# Patient Record
Sex: MALE | Race: WHITE
[De-identification: names, ages, dates, MRNs, and addresses within clinical notes are randomized per-mention and may not be internally consistent; named-entity substitution may affect disease eponyms.]

---

## 2018-05-21 ENCOUNTER — HOSPITAL ENCOUNTER (EMERGENCY)
Dept: HOSPITAL 62 - ER | Age: 38
Discharge: HOME | End: 2018-05-21
Payer: COMMERCIAL

## 2018-05-21 VITALS — DIASTOLIC BLOOD PRESSURE: 70 MMHG | SYSTOLIC BLOOD PRESSURE: 139 MMHG

## 2018-05-21 DIAGNOSIS — R05: ICD-10-CM

## 2018-05-21 DIAGNOSIS — M54.2: ICD-10-CM

## 2018-05-21 DIAGNOSIS — R79.1: Primary | ICD-10-CM

## 2018-05-21 DIAGNOSIS — R00.0: ICD-10-CM

## 2018-05-21 DIAGNOSIS — R06.02: ICD-10-CM

## 2018-05-21 DIAGNOSIS — Z88.1: ICD-10-CM

## 2018-05-21 DIAGNOSIS — R09.89: ICD-10-CM

## 2018-05-21 PROCEDURE — 36415 COLL VENOUS BLD VENIPUNCTURE: CPT

## 2018-05-21 PROCEDURE — 99284 EMERGENCY DEPT VISIT MOD MDM: CPT

## 2018-05-21 PROCEDURE — 93005 ELECTROCARDIOGRAM TRACING: CPT

## 2018-05-21 PROCEDURE — 71275 CT ANGIOGRAPHY CHEST: CPT

## 2018-05-21 PROCEDURE — 84484 ASSAY OF TROPONIN QUANT: CPT

## 2018-05-21 PROCEDURE — 93010 ELECTROCARDIOGRAM REPORT: CPT

## 2018-05-21 NOTE — ER DOCUMENT REPORT
ED General





- General


Chief Complaint: Abnormal Lab Results


Stated Complaint: ABNORMAL LABS


Time Seen by Provider: 05/21/18 15:47


Notes: 





37-year-old male sent here by PCP to rule out pulmonary embolism.  He was seen 

this morning and blood work revealed an elevated d-dimer with negative troponin 

so he was sent here for a CTA chest.  Over the past week and a half he has had 

a dry cough with some midsternal chest discomfort that is worse with breathing 

but not exertion.  It is also worse with coughing.  He has also been short of 

breath with exertion for the past 1 week but does not have any shortness of 

breath at rest.  He has also had some left-sided neck pain and left arm 

tightness that started early this morning which is what prompted him to go to 

his PCP.  It is not worse with exertion.  No prior history of PE/DVT.





- Related Data


Allergies/Adverse Reactions: 


 





erythromycin base [From Erythrocin] Allergy (Verified 05/21/18 15:43)


 











Past Medical History





- Social History


Smoking Status: Never Smoker


Chew tobacco use (# tins/day): No


Frequency of alcohol use: None


Drug Abuse: None


Family History: None


Patient has suicidal ideation: No


Patient has homicidal ideation: No


Renal/ Medical History: Denies: Hx Peritoneal Dialysis





Review of Systems





- Review of Systems


Notes: 





See history of present illness for pertinent positive review of systems; 

otherwise all review of systems have been reviewed and are negative





Physical Exam





- Vital signs


Vitals: 


 











Temp Pulse Resp BP Pulse Ox


 


 98.4 F   107 H  20   124/62   97 


 


 05/21/18 14:20  05/21/18 14:20  05/21/18 14:20  05/21/18 14:20  05/21/18 14:20














- Notes


Notes: 





PHYSICAL EXAMINATION:





GENERAL: Well-appearing and in no acute distress.





HEAD: Atraumatic, normocephalic.





EYES: Pupils equal round and reactive to light, extraocular movements intact, 

sclera anicteric, conjunctiva are normal.





ENT: nares patent, oropharynx clear without exudates.  Moist mucous membranes.





NECK: Normal range of motion, supple without lymphadenopathy





LUNGS: CTAB and equal.  No wheezes rales or rhonchi.





HEART: Minimally tachycardic rate low 100s and regular rhythm without murmurs





ABDOMEN: Soft, no tenderness.  No facial grimacing/wincing upon palpation.  No 

guarding, no rebound.





EXTREMITIES: Normal range of motion, no pitting edema.  No cyanosis.





NEUROLOGICAL: Cranial nerves grossly intact. Normal sensory/motor exams.





PSYCH: Normal mood, normal affect.





SKIN: Warm, Dry, normal turgor, no rashes or lesions noted





Course





- Re-evaluation


Re-evalutation: 





05/21/18 18:02


MEDICAL DECISION MAKING:


Concern for PE was ruled out with a CTA that did not show any clots


Repeat 6 hour troponin was also negative; repeat heart rate 102


Discussed findings with patient including instructions follow up PCP next day 

or few


Patient understands and agrees to the plan of care








- Vital Signs


Vital signs: 


 











Temp Pulse Resp BP Pulse Ox


 


 98.4 F   102 H  19   139/70 H  99 


 


 05/21/18 14:20  05/21/18 18:01  05/21/18 18:01  05/21/18 18:01  05/21/18 18:01














Discharge





- Discharge


Clinical Impression: 


 Elevated d-dimer





Condition: Good


Disposition: HOME, SELF-CARE


Additional Instructions: 


You were seen in the emergency department at [facility].  The CT 

scan did not show any blood clots.  Your repeat troponin (cardiac enzyme) test 

was negative.  Please followup with your primary physician in the next few days 

for further management/evaluation.  Please return to the emergency department 

for worsening of symptoms or any symptom that you deem to be concerning or life-

threatening.  Thank you for allowing us to be part of your care.

## 2018-05-21 NOTE — ER DOCUMENT REPORT
ED Medical Screen (RME)





- General


Chief Complaint: Abnormal Lab Results


Stated Complaint: ABNORMAL LABS


Time Seen by Provider: 05/21/18 15:47


Notes: 





RAPID MEDICAL EVALUATION DISCLOSURE


I have seen this patient as part of a Rapid Medical Evaluation and, if 

applicable, placed any initially appropriate orders. The patient will be seen 

and fully evaluated, including a full history and physical exam, by a provider (

in Main ED or Fast Track) when a room becomes available.





------------------------------------------------------------------





37-year-old male sent here by PCP to rule out pulmonary embolism.  He was seen 

this morning and blood work revealed an elevated d-dimer with negative troponin 

so he was sent here for a CTA chest.  Over the past week and a half he has had 

a dry cough with some midsternal chest discomfort that is worse with breathing 

but not exertion.  It is also worse with coughing.  He has also been short of 

breath with exertion for the past 1 week but does not have any shortness of 

breath at rest.  He has also had some left-sided neck pain and left arm 

tightness that started early this morning which is what prompted him to go to 

his PCP.  It is not worse with exertion.  No prior history of PE/DVT.





EXAM


CTAB


tachycardic





- Related Data


Allergies/Adverse Reactions: 


 





erythromycin base [From Erythrocin] Allergy (Verified 05/21/18 15:43)


 











Physical Exam





- Vital signs


Vitals: 





 











Temp Pulse Resp BP Pulse Ox


 


 98.4 F   107 H  20   124/62   97 


 


 05/21/18 14:20  05/21/18 14:20  05/21/18 14:20  05/21/18 14:20  05/21/18 14:20














Course





- Vital Signs


Vital signs: 





 











Temp Pulse Resp BP Pulse Ox


 


 98.4 F   107 H  20   124/62   97 


 


 05/21/18 14:20  05/21/18 14:20  05/21/18 14:20  05/21/18 14:20  05/21/18 14:20

## 2018-05-21 NOTE — RADIOLOGY REPORT (SQ)
EXAM DESCRIPTION:  CTA CHEST



COMPLETED DATE/TIME:  5/21/2018 4:28 pm



REASON FOR STUDY:  high dimer; eval PE cough and shortness of breath for 1 week



COMPARISON:  None.



TECHNIQUE:  CT scan of the chest performed using helical scanning technique with dynamic intravenous 
contrast injection.  Images reviewed with lung, soft tissue and bone windows.  Reconstructed coronal 
and sagittal MPR images reviewed.

Additional 3 dimensional post-processing performed to develop Maximal Intensity Projection images (MI
P).  All images stored on PACS.

All CT scanners at this facility use dose modulation, iterative reconstruction, and/or weight based d
osing when appropriate to reduce radiation dose to as low as reasonably achievable (ALARA).

[facility]: Dose Right  [facility]: CareDose    [facility]: Dose Right    [facility]: Teradose 4D    [facility]: Smart Technologies



CONTRAST TYPE AND DOSE:  contrast/concentration: Isovue 370.00 mg/ml; Total Contrast Delivered: 86.0 
ml; Total Saline Delivered: 72.0 ml

Contrast bolus optimized for the pulmonary arteries. Not diagnostic for the aorta.



RENAL FUNCTION:  Creatinine 0.66



RADIATION DOSE:  CT Rad equipment meets quality standard of care and radiation dose reduction techniq
ues were employed. CTDIvol: 33.1 - 41.5 mGy. DLP: 1652 mGy-cm. .



LIMITATIONS:  None.



FINDINGS:  LUNGS AND PLEURA: No masses, infiltrates, pneumothorax.  No pleural effusions, calcificati
ons.  No worrisome nodules.  Benign pleural thickening along the right minor fissure.

AORTA AND GREAT VESSELS: No aneurysm.  Contrast bolus not optimized for the aorta.

HEART: No pericardial effusion. No significant coronary artery calcifications.

PULMONARY ARTERIES: No emboli visualized in the main pulmonary arteries or the segmental branches.

HILAR AND MEDIASTINAL STRUCTURES: No identified masses or abnormal nodes.

HARDWARE: None in the chest.

UPPER ABDOMEN: No significant findings.  Limited exam.

THYROID AND OTHER SOFT TISSUES: No masses.  No adenopathy.

BONES: No acute or significant finding.

3D MIPS: Confirm above findings.

OTHER: No other significant finding.



IMPRESSION:  NORMAL CTA OF THE CHEST. NO PULMONARY EMBOLI.



COMMENT:  Quality ID # 436: Final reports with documentation of one or more dose reduction techniques
 (e.g., Automated exposure control, adjustment of the mA and/or kV according to patient size, use of 
iterative reconstruction technique)



TECHNICAL DOCUMENTATION:  JOB ID:  [id number]

 [employer]- All Rights Reserved



Reading location - IP/workstation name: [email or web address]

## 2019-07-09 ENCOUNTER — HOSPITAL ENCOUNTER (OUTPATIENT)
Dept: HOSPITAL 62 - OD | Age: 39
End: 2019-07-09
Attending: PHYSICIAN ASSISTANT
Payer: COMMERCIAL

## 2019-07-09 DIAGNOSIS — R06.02: ICD-10-CM

## 2019-07-09 DIAGNOSIS — R07.9: Primary | ICD-10-CM

## 2019-07-09 LAB
ALBUMIN SERPL-MCNC: 4.2 G/DL (ref 3.5–5)
ALP SERPL-CCNC: 87 U/L (ref 38–126)
ALT SERPL-CCNC: 70 U/L (ref 21–72)
ANION GAP SERPL CALC-SCNC: 10 MMOL/L (ref 5–19)
AST SERPL-CCNC: 57 U/L (ref 17–59)
BILIRUB DIRECT SERPL-MCNC: 0.3 MG/DL (ref 0–0.4)
BILIRUB SERPL-MCNC: 0.6 MG/DL (ref 0.2–1.3)
BUN SERPL-MCNC: 12 MG/DL (ref 7–20)
CALCIUM: 9.4 MG/DL (ref 8.4–10.2)
CHLORIDE SERPL-SCNC: 101 MMOL/L (ref 98–107)
CK MB SERPL-MCNC: 0.55 NG/ML (ref ?–4.55)
CK SERPL-CCNC: 59 U/L (ref 55–170)
CO2 SERPL-SCNC: 26 MMOL/L (ref 22–30)
ERYTHROCYTE [DISTWIDTH] IN BLOOD BY AUTOMATED COUNT: 14.6 % (ref 11.5–14)
GLUCOSE SERPL-MCNC: 103 MG/DL (ref 75–110)
HCT VFR BLD CALC: 43.5 % (ref 37.9–51)
HGB BLD-MCNC: 14.7 G/DL (ref 13.5–17)
MCH RBC QN AUTO: 27.8 PG (ref 27–33.4)
MCHC RBC AUTO-ENTMCNC: 33.9 G/DL (ref 32–36)
MCV RBC AUTO: 82 FL (ref 80–97)
PLATELET # BLD: 310 10^3/UL (ref 150–450)
POTASSIUM SERPL-SCNC: 4.3 MMOL/L (ref 3.6–5)
PROT SERPL-MCNC: 7.1 G/DL (ref 6.3–8.2)
RBC # BLD AUTO: 5.31 10^6/UL (ref 4.35–5.55)
SODIUM SERPL-SCNC: 136.9 MMOL/L (ref 137–145)
TROPONIN I SERPL-MCNC: < 0.012 NG/ML
WBC # BLD AUTO: 9 10^3/UL (ref 4–10.5)

## 2019-07-09 PROCEDURE — 82550 ASSAY OF CK (CPK): CPT

## 2019-07-09 PROCEDURE — 36415 COLL VENOUS BLD VENIPUNCTURE: CPT

## 2019-07-09 PROCEDURE — 80053 COMPREHEN METABOLIC PANEL: CPT

## 2019-07-09 PROCEDURE — 82553 CREATINE MB FRACTION: CPT

## 2019-07-09 PROCEDURE — 83880 ASSAY OF NATRIURETIC PEPTIDE: CPT

## 2019-07-09 PROCEDURE — 84484 ASSAY OF TROPONIN QUANT: CPT

## 2019-07-09 PROCEDURE — 85027 COMPLETE CBC AUTOMATED: CPT
